# Patient Record
Sex: FEMALE | Race: ASIAN | NOT HISPANIC OR LATINO | Employment: FULL TIME | URBAN - NONMETROPOLITAN AREA
[De-identification: names, ages, dates, MRNs, and addresses within clinical notes are randomized per-mention and may not be internally consistent; named-entity substitution may affect disease eponyms.]

---

## 2023-04-07 ENCOUNTER — HOSPITAL ENCOUNTER (EMERGENCY)
Facility: OTHER | Age: 43
Discharge: HOME | End: 2023-04-07
Attending: EMERGENCY MEDICINE | Admitting: EMERGENCY MEDICINE

## 2023-04-07 ENCOUNTER — APPOINTMENT (OUTPATIENT)
Dept: CT IMAGING | Facility: OTHER | Age: 43
End: 2023-04-07
Attending: EMERGENCY MEDICINE

## 2023-04-07 VITALS
HEART RATE: 87 BPM | RESPIRATION RATE: 18 BRPM | TEMPERATURE: 97.8 F | DIASTOLIC BLOOD PRESSURE: 81 MMHG | OXYGEN SATURATION: 98 % | SYSTOLIC BLOOD PRESSURE: 131 MMHG

## 2023-04-07 DIAGNOSIS — K04.7 DENTAL ABSCESS: ICD-10-CM

## 2023-04-07 PROCEDURE — 250N000013 HC RX MED GY IP 250 OP 250 PS 637: Performed by: EMERGENCY MEDICINE

## 2023-04-07 PROCEDURE — 250N000011 HC RX IP 250 OP 636: Performed by: EMERGENCY MEDICINE

## 2023-04-07 PROCEDURE — 99207 PR NO CHARGE LOS: CPT | Performed by: EMERGENCY MEDICINE

## 2023-04-07 PROCEDURE — 64400 NJX AA&/STRD TRIGEMINAL NRV: CPT | Performed by: EMERGENCY MEDICINE

## 2023-04-07 PROCEDURE — 99285 EMERGENCY DEPT VISIT HI MDM: CPT | Mod: 25 | Performed by: EMERGENCY MEDICINE

## 2023-04-07 PROCEDURE — 70487 CT MAXILLOFACIAL W/DYE: CPT | Mod: TC

## 2023-04-07 RX ORDER — IBUPROFEN 100 MG/5ML
600 SUSPENSION, ORAL (FINAL DOSE FORM) ORAL ONCE
Status: COMPLETED | OUTPATIENT
Start: 2023-04-07 | End: 2023-04-07

## 2023-04-07 RX ORDER — ACETAMINOPHEN 500 MG
1000 TABLET ORAL ONCE
Status: DISCONTINUED | OUTPATIENT
Start: 2023-04-07 | End: 2023-04-07

## 2023-04-07 RX ORDER — BUPIVACAINE HYDROCHLORIDE AND EPINEPHRINE 5; 5 MG/ML; UG/ML
1.8 INJECTION, SOLUTION PERINEURAL ONCE
Status: DISCONTINUED | OUTPATIENT
Start: 2023-04-07 | End: 2023-04-08 | Stop reason: HOSPADM

## 2023-04-07 RX ORDER — ACETAMINOPHEN 650 MG/20.3ML
1000 LIQUID ORAL ONCE
Status: COMPLETED | OUTPATIENT
Start: 2023-04-07 | End: 2023-04-07

## 2023-04-07 RX ORDER — IOPAMIDOL 755 MG/ML
100 INJECTION, SOLUTION INTRAVASCULAR ONCE
Status: COMPLETED | OUTPATIENT
Start: 2023-04-07 | End: 2023-04-07

## 2023-04-07 RX ADMIN — IBUPROFEN 600 MG: 100 SUSPENSION ORAL at 22:00

## 2023-04-07 RX ADMIN — ACETAMINOPHEN 1000 MG: 160 LIQUID ORAL at 22:00

## 2023-04-07 RX ADMIN — IOPAMIDOL 100 ML: 755 INJECTION, SOLUTION INTRAVENOUS at 22:32

## 2023-04-07 ASSESSMENT — ACTIVITIES OF DAILY LIVING (ADL)
ADLS_ACUITY_SCORE: 35
ADLS_ACUITY_SCORE: 33

## 2023-04-08 ENCOUNTER — HOSPITAL ENCOUNTER (EMERGENCY)
Facility: OTHER | Age: 43
Discharge: HOME | End: 2023-04-09
Attending: EMERGENCY MEDICINE | Admitting: EMERGENCY MEDICINE

## 2023-04-08 ENCOUNTER — NURSE TRIAGE (OUTPATIENT)
Dept: NURSING | Facility: CLINIC | Age: 43
End: 2023-04-08

## 2023-04-08 DIAGNOSIS — K04.7 DENTAL ABSCESS: ICD-10-CM

## 2023-04-08 PROCEDURE — 250N000013 HC RX MED GY IP 250 OP 250 PS 637: Performed by: EMERGENCY MEDICINE

## 2023-04-08 PROCEDURE — 41800 DRAINAGE OF GUM LESION: CPT | Performed by: EMERGENCY MEDICINE

## 2023-04-08 PROCEDURE — 64400 NJX AA&/STRD TRIGEMINAL NRV: CPT | Mod: XU | Performed by: EMERGENCY MEDICINE

## 2023-04-08 PROCEDURE — 99207 PR NO CHARGE LOS: CPT | Performed by: EMERGENCY MEDICINE

## 2023-04-08 PROCEDURE — 99283 EMERGENCY DEPT VISIT LOW MDM: CPT | Mod: 25 | Performed by: EMERGENCY MEDICINE

## 2023-04-08 RX ORDER — OXYCODONE HYDROCHLORIDE 5 MG/1
5 TABLET ORAL ONCE
Status: COMPLETED | OUTPATIENT
Start: 2023-04-08 | End: 2023-04-08

## 2023-04-08 RX ADMIN — OXYCODONE HYDROCHLORIDE 5 MG: 5 TABLET ORAL at 23:44

## 2023-04-08 NOTE — DISCHARGE INSTRUCTIONS
Make sure you are drinking plenty of water.  Most adults need 1 to 2 L/day.  Urine should be light/clear  Eat soft foods as able.  Avoid sharp foods or foods to small pieces like chips, nuts, and seeds.  Continue your dental hygiene with a very soft toothbrush and alcohol free mouthwash  Tylenol 500-1000 mg every 6 hours as needed for pain.  Do not take more than 4000 mg per day   Ibuprofen 600 mg every 6 hours as needed for pain with food and plenty of water. Discontinue use after 5 days.   Apply ice wrapped in a towel to painful areas for 10 minutes 5-6 times per day.  Do not fall asleep on ice pack as it can cause frostbite  Call your dentist first thing on Monday.  Discuss if you need to change your antibiotics or be seen sooner.  Seek emergency care for worsening pain, difficulty swallowing, problems breathing, vomiting, severe swelling, fever, or if you have any new or changing symptoms or concerns.

## 2023-04-08 NOTE — ED PROVIDER NOTES
History     Chief Complaint   Patient presents with     Dental Pain     HPI  Manasa Yanez is a 43 year old female who presents with dental pain. Was seen by dentist in [place] about 1.5 weeks ago, had x-rays for mild dental pain. Was told she needed to have them pulled but had to take antibiotics first. Started amoxicillin 3 days ago 500 mg every 8 hours. Pain started getting worse yesterday, R posterior mandibular molar radiating to entire R jaw. No gagging or spitting saliva. Reports swelling in face. No measured fevers, feels warm. No vomiting, no vision changes, no skin rash. Has history of dental issues.    PMH: asthma  PSH: laparoscopy for endometriosis,   Current smoker (vapes)    Allergies:  Allergies   Allergen Reactions     Latex        Problem List:    There are no problems to display for this patient.       Past Medical History:    No past medical history on file.    Past Surgical History:    No past surgical history on file.    Family History:    No family history on file.    Social History:  Marital Status:          Medications:    No current outpatient medications on file.        Review of Systems  Please seen HPI for pertinent positives and negatives. All other systems reviewed and found to be negative.   Physical Exam   BP: 131/81  Pulse: 87  Temp: 97.8  F (36.6  C)  Resp: 18  SpO2: 98 %      Physical Exam  Constitutional:       General: She is in acute distress.      Appearance: She is not ill-appearing.   HENT:      Head: Normocephalic and atraumatic.      Nose: Nose normal.      Mouth/Throat:      Mouth: Mucous membranes are moist.      Pharynx: Oropharynx is clear.      Comments: Multiple dental caries and broken teeth.  Right mandibular molars very sensitive to palpation.  No visible drainable abscess.  Mild swelling of the right mandibular gums.  Mild swelling of the right cheek with minimal tenderness.  No cellulitis or fluctuance  Eyes:      Conjunctiva/sclera: Conjunctivae normal.       Pupils: Pupils are equal, round, and reactive to light.   Cardiovascular:      Rate and Rhythm: Normal rate.   Pulmonary:      Effort: Pulmonary effort is normal.   Musculoskeletal:      Cervical back: Normal range of motion.   Skin:     General: Skin is warm and dry.   Neurological:      Mental Status: She is alert and oriented to person, place, and time.         ED Course                 [facility]    Dental Block    Date/Time: 4/8/2023 12:05 AM    Performed by: Bronwyn Green MD  Authorized by: Bronwyn Green MD    Risks, benefits and alternatives discussed.      INDICATIONS     Indications: dental abscess and dental pain      LOCATION     Block type:  Inferior alveolar    Laterality:  Right    PROCEDURE DETAILS     Topical anesthetic:  Lidocaine gel    Syringe type:  Controlled syringe    Needle gauge:  27 G    Anesthetic injected:  Bupivacaine 0.5% WITH epi    Injection procedure:  Anatomic landmarks palpated, anatomic landmarks identified, introduced needle, negative aspiration for blood and incremental injection    POST PROCEDURE DETAILS      Outcome:  Pain relieved      PROCEDURE    Patient Tolerance:  Patient tolerated the procedure well with no immediate complications                Critical Care time:  none               Results for orders placed or performed during the hospital encounter of 04/07/23 (from the past 24 hour(s))   CT Facial Bones with Contrast    Narrative    PROCEDURE INFORMATION:   Exam: CT Maxillofacial With Contrast; Mandible   Exam date and time: 4/7/2023 10:25 PM   Age: 43 years old   Clinical indication: Jaw pain; Additional info: Dental abscess     TECHNIQUE:   Imaging protocol: Computed tomography maxillofacial with intravenous contrast.   Exam focused on the mandible.   Radiation optimization: All CT scans at this facility use at least one of these   dose optimization techniques: automated exposure control; mA and/or kV   adjustment per  patient size (includes targeted exams where dose is matched to   clinical indication); or iterative reconstruction.   Contrast material: ISOVUE 370; Contrast volume: 100 ml; Contrast route:   INTRAVENOUS (IV);      REPORTING DATA:   Count of CT and Cardiac NM exams in prior 12 months: This patient has received   0 known CTs and 0 known cardiac nuclear medicine studies in the 12 months prior   to the current study.     COMPARISON:   No relevant prior studies available.     FINDINGS:   Limitations: Streak artifact from dental restoration.     Bones/joints: Bilateral temporomandibular joint degenerative changes. No acute   fracture.    Paranasal sinuses: Mild sphenoid sinus mucosal thickening. No layering fluid.   Other paranasal sinuses are unremarkable.   Lymph nodes: Bilateral submandibular lymphadenopathy.   Soft tissues: No discrete soft tissue fluid collection.   Dental: Diffuse dental caries with cavities in most of the remaining teeth.   Periapical lucencies of teeth numbers 3, 7, 19, 20, 21, 27, 28, 29 and 31.   Vasculature: Prominent median cervical vein.       Impression    IMPRESSION:   1.   Multiple dental cavities and periapical abscesses.   2.   No discrete soft tissue abscess.     THIS DOCUMENT HAS BEEN ELECTRONICALLY SIGNED BY JENA MAYA MD       Medications   bupivacaine 0.5 % EPINEPHrine 1:200,000 0.5% -[id number] dental injection SOLN 1.8 mL (has no administration in time range)   acetaminophen (TYLENOL) solution 1,000 mg (1,000 mg Oral $Given 4/7/23 2200)   ibuprofen (ADVIL/MOTRIN) suspension 600 mg (600 mg Oral $Given 4/7/23 2200)   iopamidol (ISOVUE-370) solution 100 mL (100 mLs Intravenous $Given 4/7/23 2232)       Assessments & Plan (with Medical Decision Making)     I have reviewed the nursing notes.    I have reviewed the findings, diagnosis, plan and need for follow up with the patient.   Ms. Yanez is a 43-year-old woman who has a history of multiple dental caries and presents with worsening  dental pain despite 3 days of antibiotic treatment.  Due to the worsening symptoms I obtained a CT to look for deep space infection.  CT showed multiple periapical abscesses both in the area of tenderness as well as on the other side of the mouth and in the upper jaw.  No findings need to be urgently addressed by oral maxillofacial surgery though patient should follow-up closely with her dentist.  Patient improved with Tylenol and ibuprofen and improved further with inferior alveolar nerve block.  She is able to tolerate p.o.  Will recommend she continue her current antibiotics but discussed with her dentist on Monday whether she should change them.  Return precautions for systemic infectious symptoms or airway/swallowing impairment discussed as detailed in AVS.  She expressed understanding.        Medical Decision Making  The patient's presentation was of moderate complexity (a chronic illness mild to moderate exacerbation, progression, or side effect of treatment).    The patient's evaluation involved:  ordering and/or review of 1 test(s) in this encounter (see separate area of note for details)    The patient's management necessitated moderate risk (a decision regarding minor procedure (Dental block) with risk factors of none).        There are no discharge medications for this patient.      Final diagnoses:   Dental abscess       4/7/2023   [facility]     Bronwyn Green MD  04/08/23 0006

## 2023-04-08 NOTE — TELEPHONE ENCOUNTER
"Pt reports she was seen in ER yesterday for dental abscess. She reports her pain is a little worse but \"they gave me a shot in ER and that is wearing off\". Pt also reports a little more facial swelling. Pt denies difficulty swallowing. Pt is continuing antibiotic. Pt is able to speak in full sentences and sounds calm, no respiratory issues heard by Writer however pt reports mild difficulty breathing.      Writer advised pt to review her discharge instructions from ER and follow them, per protocol pt should be seen again in ER if worse however if not much worse ok to follow up with dental on Monday as planned. Advised pt fever or systemic symptoms occur should have adult drive her to the ER immediately. Pt needs to decide how much worse she is since ER visit.    Pt verbalizes understanding and agrees to plan.     Reason for Disposition    [1] Taking antibiotics > 24 hours AND [2] symptoms WORSE    Additional Information    Negative: SEVERE difficulty breathing (e.g., struggling for each breath, speaks in single words)    Negative: Sounds like a life-threatening emergency to the triager    Negative: [1] Recent hospitalization for pneumonia AND [2] taking an antibiotic    Negative: [1] Animal bite infection AND [2] taking an antibiotic    Negative: [1] Cellulitis AND [2] taking an antibiotic    Negative: [1] Ear  infection (Otitis Media) AND [2] taking an antibiotic    Negative: [1] Ear  infection (Swimmer's Ear) AND [2] taking an antibiotic    Negative: [1] Sinus infection AND [2] taking an antibiotic    Negative: [1] Strep throat AND [2] taking an antibiotic    Negative: [1] Urinary tract  infection (e.g., cystitis, pyelonephritis, urethritis, epididymitis) AND [2] male AND [3] taking an antibiotic    Negative: [1] Urinary tract  infection (e.g., cystitis, pyelonephritis, urethritis) AND [2] female AND [3] taking an antibiotic    Negative: [1] Wound infection AND [2] taking an antibiotic    Negative: MODERATE " difficulty breathing (e.g., speaks in phrases, SOB even at rest, pulse 100-120)    Negative: Fever > 103 F (39.4 C)    Negative: Patient sounds very sick or weak to the triager    Protocols used: INFECTION ON ANTIBIOTIC FOLLOW-UP CALL-A-AH

## 2023-04-09 VITALS
BODY MASS INDEX: 25.61 KG/M2 | SYSTOLIC BLOOD PRESSURE: 122 MMHG | HEART RATE: 76 BPM | HEIGHT: 64 IN | TEMPERATURE: 99 F | OXYGEN SATURATION: 100 % | WEIGHT: 150 LBS | DIASTOLIC BLOOD PRESSURE: 81 MMHG | RESPIRATION RATE: 20 BRPM

## 2023-04-09 PROCEDURE — 250N000013 HC RX MED GY IP 250 OP 250 PS 637: Performed by: EMERGENCY MEDICINE

## 2023-04-09 PROCEDURE — 250N000009 HC RX 250: Performed by: EMERGENCY MEDICINE

## 2023-04-09 RX ORDER — OXYCODONE HYDROCHLORIDE 5 MG/1
5 TABLET ORAL ONCE
Status: COMPLETED | OUTPATIENT
Start: 2023-04-09 | End: 2023-04-09

## 2023-04-09 RX ORDER — BUPIVACAINE HYDROCHLORIDE AND EPINEPHRINE 5; 5 MG/ML; UG/ML
1.8 INJECTION, SOLUTION PERINEURAL ONCE
Status: COMPLETED | OUTPATIENT
Start: 2023-04-09 | End: 2023-04-09

## 2023-04-09 RX ORDER — ACETAMINOPHEN 500 MG
1000 TABLET ORAL ONCE
Status: COMPLETED | OUTPATIENT
Start: 2023-04-09 | End: 2023-04-09

## 2023-04-09 RX ADMIN — OXYCODONE HYDROCHLORIDE 5 MG: 5 TABLET ORAL at 02:03

## 2023-04-09 RX ADMIN — IBUPROFEN 600 MG: 200 TABLET, FILM COATED ORAL at 02:40

## 2023-04-09 RX ADMIN — BUPIVACAINE HYDROCHLORIDE AND EPINEPHRINE BITARTRATE 1.8 ML: 5; .005 INJECTION, SOLUTION SUBCUTANEOUS at 02:03

## 2023-04-09 RX ADMIN — ACETAMINOPHEN 1000 MG: 500 TABLET ORAL at 02:40

## 2023-04-09 ASSESSMENT — ACTIVITIES OF DAILY LIVING (ADL)
ADLS_ACUITY_SCORE: 35
ADLS_ACUITY_SCORE: 35

## 2023-04-09 NOTE — DISCHARGE INSTRUCTIONS
Ibuprofen 600 mg every 6 hours as needed for pain with food and plenty of water. Discontinue use after 5 days.   Tylenol 500-1000 mg every 6 hours as needed for pain.  Do not take more than 4000 mg per day  Keep your dental appointment today  Rinse your mouth with warm salt water after every meal  Soft diet as able  Make sure you are drinking plenty of water.  Most adults need 1 to 2 L/day.  Urine should be light/clear  Seek emergency care for uncontrolled Bleeding, vomiting, fever, inability to swallow, difficulty breathing, or if you have any new or changing symptoms/concerns.

## 2023-04-09 NOTE — ED PROVIDER NOTES
"  History   No chief complaint on file.    HPI  Manasa Yanez is a 43 year old female who presents with dental pain. Was seen here yesterday for dental pain, had CT which showed multiple periapical abscesses. Is on amoxicillin for past 5 days.  Has been taking ibuprofen and tylenol but reports is no longer helping. Pain is R lower jaw. Reports worse swelling. Reports throat and chest feel tight. Is managing secretions.   Has dentist appointment at noon tomorrow.    Allergies:  Allergies   Allergen Reactions     Latex        Problem List:    There are no problems to display for this patient.       Past Medical History:    No past medical history on file.    Past Surgical History:    No past surgical history on file.    Family History:    No family history on file.    Social History:  Marital Status:  Single [1]        Medications:    No current outpatient medications on file.        Review of Systems  Please seen HPI for pertinent positives and negatives. All other systems reviewed and found to be negative.   Physical Exam   BP: 128/86  Pulse: 79  Temp: 99  F (37.2  C)  Resp: (!) 32  Height: 162.6 cm (5' 4\")  Weight: 68 kg (150 lb)  SpO2: 100 %      Physical Exam  Constitutional:       General: She is in acute distress.      Appearance: She is not ill-appearing.   HENT:      Head: Normocephalic and atraumatic.      Nose: Nose normal.      Mouth/Throat:      Mouth: Mucous membranes are moist.      Comments: Multiple dental caries  Worsening swelling around broken right mandibular premolar  Eyes:      Conjunctiva/sclera: Conjunctivae normal.      Pupils: Pupils are equal, round, and reactive to light.   Cardiovascular:      Rate and Rhythm: Normal rate.   Pulmonary:      Effort: Pulmonary effort is normal.   Musculoskeletal:      Cervical back: Normal range of motion.   Skin:     General: Skin is warm and dry.   Neurological:      Mental Status: She is alert and oriented to person, place, and time.         ED Course "                 [facility]    PROCEDURE: -Incision/Drainage    Date/Time: 4/9/2023 5:20 AM    Performed by: Bronwyn Green MD  Authorized by: Bronwyn Green MD    Risks, benefits and alternatives discussed.      LOCATION:      Type:  Abscess    Location:  Mouth    Mouth location: Periapical, right mandibular premolar.    PROCEDURE TYPE:     Complexity:  Simple    ANESTHESIA (see MAR for exact dosages):     Anesthesia method:  Local infiltration    Local anesthetic:  Bupivacaine 0.25% WITH epi    PROCEDURE DETAILS:     Needle aspiration: no      Incision types:  Stab incision    Scalpel blade:  11    Drainage:  Bloody    Drainage amount:  Scant    Wound treatment:  Wound left open    Packing materials:  None    PROCEDURE    Patient Tolerance:  Patient tolerated the procedure well with no immediate complications            .procdo    Critical Care time:  none               No results found for this or any previous visit (from the past 24 hour(s)).    Medications   oxyCODONE (ROXICODONE) tablet 5 mg (5 mg Oral $Given 4/8/23 2344)   bupivacaine 0.5 % EPINEPHrine 1:200,000 0.5% -1:[id number] dental injection SOLN 1.8 mL (1.8 mLs Intradermal $Given by Other 4/9/23 0203)   oxyCODONE (ROXICODONE) tablet 5 mg (5 mg Oral $Given 4/9/23 0203)   ibuprofen (ADVIL/MOTRIN) tablet 600 mg (600 mg Oral $Given 4/9/23 0240)   acetaminophen (TYLENOL) tablet 1,000 mg (1,000 mg Oral $Given 4/9/23 0240)       Assessments & Plan (with Medical Decision Making)     I have reviewed the nursing notes.    I have reviewed the findings, diagnosis, plan and need for follow up with the patient.   Ms Yanez is a 43-year-old woman who presents to the ED with ongoing dental pain.  Pain is worsening despite taking ibuprofen and Tylenol as well as antibiotic.  She has no respiratory impairment and no difficulty with secretions.  No large facial swelling.  She does have worsening gum swelling.  She was given oxycodone without  improvement.  I repeated the inferior alveolar nerve block with improvement of pain.  She has an appointment with a dentist at noon on 4/9.  Discussed the possibility of a drainable abscess due to worsening gum swelling.  Gave her the option of me attempting to drain it versus waiting until she sees her dentist.  She preferred for me to attempt tonight.  I did not have return of pus after 2 small stab incisions so did not want to continue to try to drain.  Redosed her ibuprofen and Tylenol.  Reviewed return precautions.  Patient was discharged in slightly improved condition.        Medical Decision Making  The patient's presentation was of moderate complexity (a chronic illness mild to moderate exacerbation, progression, or side effect of treatment).    The patient's evaluation involved:  history and exam without other MDM data elements    The patient's management necessitated moderate risk (prescription drug management including medications given in the ED).        There are no discharge medications for this patient.      Final diagnoses:   Dental abscess       4/8/2023   [facility]     Bronwyn Green MD  04/09/23 0522       Bronwyn Green MD  04/09/23 0526

## (undated) RX ORDER — OXYCODONE HYDROCHLORIDE 5 MG/1
TABLET ORAL
Status: DISPENSED
Start: 2023-04-09

## (undated) RX ORDER — ACETAMINOPHEN 500 MG
TABLET ORAL
Status: DISPENSED
Start: 2023-04-07

## (undated) RX ORDER — IBUPROFEN 100 MG/5ML
SUSPENSION, ORAL (FINAL DOSE FORM) ORAL
Status: DISPENSED
Start: 2023-04-07

## (undated) RX ORDER — BUPIVACAINE HYDROCHLORIDE AND EPINEPHRINE 5; 5 MG/ML; UG/ML
INJECTION, SOLUTION PERINEURAL
Status: DISPENSED
Start: 2023-04-07

## (undated) RX ORDER — ACETAMINOPHEN 500 MG
TABLET ORAL
Status: DISPENSED
Start: 2023-04-09

## (undated) RX ORDER — BUPIVACAINE HYDROCHLORIDE AND EPINEPHRINE 5; 5 MG/ML; UG/ML
INJECTION, SOLUTION PERINEURAL
Status: DISPENSED
Start: 2023-04-09

## (undated) RX ORDER — IBUPROFEN 200 MG
TABLET ORAL
Status: DISPENSED
Start: 2023-04-07

## (undated) RX ORDER — IBUPROFEN 200 MG
TABLET ORAL
Status: DISPENSED
Start: 2023-04-09

## (undated) RX ORDER — OXYCODONE HYDROCHLORIDE 5 MG/1
TABLET ORAL
Status: DISPENSED
Start: 2023-04-08